# Patient Record
(demographics unavailable — no encounter records)

---

## 2024-11-06 NOTE — ASSESSMENT
[FreeTextEntry1] : Parkinsonism  2-month status post right knee replacement He will continue aggressive physical therapy and continue the Sinemet 25/100, 3 times a day  Follow-up in 6 months and by phone prior to that as needed.

## 2024-11-06 NOTE — HISTORY OF PRESENT ILLNESS
[FreeTextEntry1] : I saw him initially for/12/23 with the following history He is here with his wife.  He is right-handed. Patient says he has had trouble walking for about the last 6 months.  He has fallen twice when he seems to have tripped. In addition he believes he has some mild weakness of the right arm. When his wife was questioned she believes his voice has become lower in volume.  He has no trouble chewing or swallowing He has been under care of orthopedics and he is scheduled to go undergo right knee replacement surgery next week in the hopes that it will improve his walking.  He has no pain in the knee whatsoever.  He did receive a gel injection. X-rays reportedly show bone-on-bone. Has no back pain.  No tremors reported.  Medical history significant for hypothyroid  No tobacco or alcohol abuse  I suspected Parkinson's. I referred him for a DaTscan which was confirmatory.  Brain MRI was unremarkable I started him on Sinemet 25/100, 3 times a day on 4/27/2023.  Both him and his wife have noticed definite improvement in his movements and ambulation. He did not undergo the knee replacement surgery I had referred him for some physical therapy and he feels that it is helping.  11/6/2024: He comes in today with his wife He underwent right knee replacement surgery 2 months ago and is recovering from that He is going for extensive therapy He remains on the Sinemet 25/100, 3 times a day.

## 2024-11-06 NOTE — PHYSICAL EXAM
[FreeTextEntry1] : There is mild masking of the facies\par  Speech is mildly hypophonic\par  No tremors today\par  Mild cogwheeling at the right arm\par  Mild diffuse bradykinesia [General Appearance - Alert] : alert [General Appearance - In No Acute Distress] : in no acute distress [General Appearance - Well-Appearing] : healthy appearing [Oriented To Time, Place, And Person] : oriented to person, place, and time [Impaired Insight] : insight and judgment were intact [Affect] : the affect was normal [Memory Recent] : recent memory was not impaired [Person] : oriented to person [Place] : oriented to place [Time] : oriented to time [Concentration Intact] : normal concentrating ability [Fluency] : fluency intact [Comprehension] : comprehension intact [Past History] : adequate knowledge of personal past history [Cranial Nerves Optic (II)] : visual acuity intact bilaterally,  visual fields full to confrontation, pupils equal round and reactive to light [Cranial Nerves Trigeminal (V)] : facial sensation intact symmetrically [Cranial Nerves Oculomotor (III)] : extraocular motion intact [Cranial Nerves Facial (VII)] : face symmetrical [Cranial Nerves Vestibulocochlear (VIII)] : hearing was intact bilaterally [Cranial Nerves Glossopharyngeal (IX)] : tongue and palate midline [Cranial Nerves Accessory (XI - Cranial And Spinal)] : head turning and shoulder shrug symmetric [Cranial Nerves Hypoglossal (XII)] : there was no tongue deviation with protrusion [Motor Tone] : muscle tone was normal in all four extremities [No Muscle Atrophy] : normal bulk in all four extremities [Motor Strength] : muscle strength was normal in all four extremities [Paresis Pronator Drift Right-Sided] : no pronator drift on the right [Paresis Pronator Drift Left-Sided] : no pronator drift on the left [Sensation Tactile Decrease] : light touch was intact [Sensation Pain / Temperature Decrease] : pain and temperature was intact [Proprioception] : proprioception was intact [Romberg's Sign] : Romberg's sign was negtive [Balance] : balance was intact [Past-pointing] : there was no past-pointing [Tremor] : no tremor present [Coordination - Dysmetria Impaired Finger-to-Nose Bilateral] : not present [Coordination - Dysmetria Impaired Heel-to-Shin Bilateral] : not present [2+] : Brachioradialis left 2+ [1+] : Patella left 1+ [0] : Ankle jerk left 0 [FreeTextEntry8] : Gait slowed, broad-based, decreased arm swing, turns in about 2 steps. [PERRL With Normal Accommodation] : pupils were equal in size, round, reactive to light, with normal accommodation [Extraocular Movements] : extraocular movements were intact [Full Visual Field] : full visual field

## 2025-05-06 NOTE — HISTORY OF PRESENT ILLNESS
[FreeTextEntry1] : I saw him initially for/12/23 with the following history He is here with his wife.  He is right-handed. Patient says he has had trouble walking for about the last 6 months.  He has fallen twice when he seems to have tripped. In addition he believes he has some mild weakness of the right arm. When his wife was questioned she believes his voice has become lower in volume.  He has no trouble chewing or swallowing He has been under care of orthopedics and he is scheduled to go undergo right knee replacement surgery next week in the hopes that it will improve his walking.  He has no pain in the knee whatsoever.  He did receive a gel injection. X-rays reportedly show bone-on-bone. Has no back pain.  No tremors reported.  Medical history significant for hypothyroid  No tobacco or alcohol abuse  I suspected Parkinson's. I referred him for a DaTscan which was confirmatory.  Brain MRI was unremarkable I started him on Sinemet 25/100, 3 times a day on 4/27/2023.  Both him and his wife have noticed definite improvement in his movements and ambulation. He did not undergo the knee replacement surgery I had referred him for some physical therapy and he feels that it is helping.  11/6/2024: He comes in today with his wife He underwent right knee replacement surgery 2 months ago and is recovering from that He is going for extensive therapy He remains on the Sinemet 25/100, 3 times a day.  5/6/2025: He is here today with his wife He continues with physical therapy He remains on Sinemet 25/100 3 times a day His gait is impacted both by the parkinsonism and his knee problems No trouble with chewing or swallowing.  No trouble using his arms

## 2025-05-06 NOTE — REVIEW OF SYSTEMS
Chief Complaint(s) and History of Present Illness(es)       Juvenile Rheumatoid Arthritis              Associated symptoms: Negative for tearing, trauma and pain with eye movement    Comments: Patient reports no new changes in vision. Peripheral vision has remained the same, no improvement or decrease in vision. No new changes to medications. Methotrexate 50 MG/2ML injection weekly and REMICADE 100 MG injection Q28D. No strabismus, redness, tearing or light sensitivity.               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 MRI of the Brain: axial turboFLAIR and axial diffusion-weighted  with ADC map images of the brain were obtained without intravenous  contrast.  After intravenous administration of gadolinium, axial  T1-weighted images of the brain were obtained.     2. MRI of the Orbits focused on the orbits/visual pathways:  Axial  T2-weighted with inversion recovery and coronal T1-weighted images  were obtained without intravenous contrast. Axial and coronal  T1-weighted images with fat saturation were obtained after intravenous  gadolinium administration.     CONTRAST: 5.2ml gadavist IV.     FINDINGS:  There is no preseptal or postseptal edema of the orbits. There is no  hematoma within the orbits. The intraconal and extraconal spaces  bilaterally are normal. No abscess.     Normal optic nerves and optic chiasm.     No abnormal contrast enhancement in either the preseptal or postseptal  tissues.     There is no mass effect, midline shift, or intracranial hemorrhage.  The ventricles are proportionate to the cerebral sulci. Diffusion and  susceptibility weighted images are negative for acute/focal  abnormality. Major intracranial vascular structures are within normal  limits.     No suspicious abnormality of the skull marrow signal. Clear paranasal  sinuses.  Mastoid air cells are clear. No focal abnormality of the  pituitary gland, sella, skull base and upper cervical spinal  structures on sagittal images.                                                                       IMPRESSION:  1. Regarding the orbits and globes, there is no abnormal contrast  enhancement or mass.  2. No abnormality of the remainder of the brain.                    [As Noted in HPI] : as noted in HPI [Negative] : Heme/Lymph

## 2025-05-06 NOTE — PHYSICAL EXAM
[FreeTextEntry1] : There is mild masking of the facies\par  Speech is mildly hypophonic\par  No tremors today\par  Mild cogwheeling at the right arm\par  Mild diffuse bradykinesia [General Appearance - Alert] : alert [General Appearance - In No Acute Distress] : in no acute distress [General Appearance - Well-Appearing] : healthy appearing [Oriented To Time, Place, And Person] : oriented to person, place, and time [Impaired Insight] : insight and judgment were intact [Affect] : the affect was normal [Memory Recent] : recent memory was not impaired [Person] : oriented to person [Place] : oriented to place [Time] : oriented to time [Concentration Intact] : normal concentrating ability [Fluency] : fluency intact [Comprehension] : comprehension intact [Past History] : adequate knowledge of personal past history [Cranial Nerves Optic (II)] : visual acuity intact bilaterally,  visual fields full to confrontation, pupils equal round and reactive to light [Cranial Nerves Oculomotor (III)] : extraocular motion intact [Cranial Nerves Trigeminal (V)] : facial sensation intact symmetrically [Cranial Nerves Facial (VII)] : face symmetrical [Cranial Nerves Vestibulocochlear (VIII)] : hearing was intact bilaterally [Cranial Nerves Glossopharyngeal (IX)] : tongue and palate midline [Cranial Nerves Accessory (XI - Cranial And Spinal)] : head turning and shoulder shrug symmetric [Cranial Nerves Hypoglossal (XII)] : there was no tongue deviation with protrusion [Motor Tone] : muscle tone was normal in all four extremities [Motor Strength] : muscle strength was normal in all four extremities [No Muscle Atrophy] : normal bulk in all four extremities [Paresis Pronator Drift Right-Sided] : no pronator drift on the right [Paresis Pronator Drift Left-Sided] : no pronator drift on the left [Sensation Tactile Decrease] : light touch was intact [Sensation Pain / Temperature Decrease] : pain and temperature was intact [Proprioception] : proprioception was intact [Romberg's Sign] : Romberg's sign was negtive [Balance] : balance was intact [Past-pointing] : there was no past-pointing [Tremor] : no tremor present [Coordination - Dysmetria Impaired Finger-to-Nose Bilateral] : not present [Coordination - Dysmetria Impaired Heel-to-Shin Bilateral] : not present [2+] : Brachioradialis left 2+ [1+] : Patella left 1+ [0] : Ankle jerk left 0 [FreeTextEntry8] : Gait slowed, broad-based, decreased arm swing, turns in about 2 steps.  Favors right leg somewhat upon ambulation [PERRL With Normal Accommodation] : pupils were equal in size, round, reactive to light, with normal accommodation [Extraocular Movements] : extraocular movements were intact [Full Visual Field] : full visual field

## 2025-05-06 NOTE — ASSESSMENT
[FreeTextEntry1] : Parkinsonism  Status post right knee replacement He will continue aggressive physical therapy and continue the Sinemet 25/100, 3 times a day  Follow-up in 6 months and by phone prior to that as needed.